# Patient Record
Sex: FEMALE | Race: ASIAN | NOT HISPANIC OR LATINO
[De-identification: names, ages, dates, MRNs, and addresses within clinical notes are randomized per-mention and may not be internally consistent; named-entity substitution may affect disease eponyms.]

---

## 2023-03-30 ENCOUNTER — APPOINTMENT (OUTPATIENT)
Dept: CT IMAGING | Facility: CLINIC | Age: 63
End: 2023-03-30
Payer: COMMERCIAL

## 2023-03-30 ENCOUNTER — OUTPATIENT (OUTPATIENT)
Dept: OUTPATIENT SERVICES | Facility: HOSPITAL | Age: 63
LOS: 1 days | End: 2023-03-30
Payer: COMMERCIAL

## 2023-03-30 DIAGNOSIS — Z00.8 ENCOUNTER FOR OTHER GENERAL EXAMINATION: ICD-10-CM

## 2023-03-30 PROCEDURE — 75574 CT ANGIO HRT W/3D IMAGE: CPT

## 2023-03-30 PROCEDURE — 75574 CT ANGIO HRT W/3D IMAGE: CPT | Mod: 26

## 2023-05-16 ENCOUNTER — APPOINTMENT (OUTPATIENT)
Dept: GASTROENTEROLOGY | Facility: CLINIC | Age: 63
End: 2023-05-16
Payer: COMMERCIAL

## 2023-05-16 VITALS
OXYGEN SATURATION: 97 % | BODY MASS INDEX: 24.45 KG/M2 | TEMPERATURE: 98.3 F | SYSTOLIC BLOOD PRESSURE: 140 MMHG | HEIGHT: 63 IN | DIASTOLIC BLOOD PRESSURE: 91 MMHG | RESPIRATION RATE: 16 BRPM | HEART RATE: 73 BPM | WEIGHT: 138 LBS

## 2023-05-16 DIAGNOSIS — K82.4 CHOLESTEROLOSIS OF GALLBLADDER: ICD-10-CM

## 2023-05-16 DIAGNOSIS — R31.29 OTHER MICROSCOPIC HEMATURIA: ICD-10-CM

## 2023-05-16 PROCEDURE — 99203 OFFICE O/P NEW LOW 30 MIN: CPT

## 2023-05-16 NOTE — PHYSICAL EXAM
[Alert] : alert [No Respiratory Distress] : no respiratory distress [Respiration, Rhythm And Depth] : normal respiratory rhythm and effort [Abdomen Tenderness] : non-tender [Abdomen Soft] : soft [Oriented To Time, Place, And Person] : oriented to person, place, and time

## 2023-05-30 NOTE — ASSESSMENT
[FreeTextEntry1] : Patient is a 62 year old female with a history of HTN who presents due to referral from Dr. Melanie Jauregui for evaluation of gallbladder polyp.  \par \par \par Patient advised to undergo upper endoscopy with endoscopic ultrasound in 6 months at [facility] to assess the gallbladder.  Patient's daughter Erma will be contacted in September 2023 to schedule the procedure.  \par \par \par \par I, Shavon Beyer; PA, am scribing for and in the presence of Dr. Yunior Malone the following sections: history of present illness, past medical/family/social history; review of systems; vital signs; physical exam; disposition.\par

## 2023-05-30 NOTE — HISTORY OF PRESENT ILLNESS
[FreeTextEntry1] : Patient is a 62 year old female with a history of HTN who presents due to referral from Dr. Melanie Jauregui for evaluation of gallbladder polyp.  Patient states that she was experiencing upper abdominal discomfort.  She underwent ultrasound of abdomen on 2/28/2023 that showed normal CBD of 3 mm and gallbladder with 0.5 cm gallbladder polyp.  Patient denies nausea, vomiting, changes in bowel habits, dark stool and BRBPR.  Patient admits to intact appetite and stable weight.\par \par Patient denies family history of colon, gastric and pancreatic cancer.\par

## 2023-09-13 ENCOUNTER — APPOINTMENT (OUTPATIENT)
Dept: GASTROENTEROLOGY | Facility: HOSPITAL | Age: 63
End: 2023-09-13

## 2023-09-13 ENCOUNTER — OUTPATIENT (OUTPATIENT)
Dept: OUTPATIENT SERVICES | Facility: HOSPITAL | Age: 63
LOS: 1 days | Discharge: HOME | End: 2023-09-13
Payer: COMMERCIAL

## 2023-09-13 VITALS
OXYGEN SATURATION: 100 % | DIASTOLIC BLOOD PRESSURE: 85 MMHG | TEMPERATURE: 97 F | HEIGHT: 63 IN | WEIGHT: 138.01 LBS | SYSTOLIC BLOOD PRESSURE: 157 MMHG | RESPIRATION RATE: 14 BRPM | HEART RATE: 62 BPM

## 2023-09-13 PROCEDURE — 43259 EGD US EXAM DUODENUM/JEJUNUM: CPT

## 2023-09-18 ENCOUNTER — NON-APPOINTMENT (OUTPATIENT)
Age: 63
End: 2023-09-18

## 2024-07-11 ENCOUNTER — APPOINTMENT (OUTPATIENT)
Dept: MRI IMAGING | Facility: CLINIC | Age: 64
End: 2024-07-11